# Patient Record
Sex: MALE | Race: WHITE | NOT HISPANIC OR LATINO | Employment: UNEMPLOYED | URBAN - METROPOLITAN AREA
[De-identification: names, ages, dates, MRNs, and addresses within clinical notes are randomized per-mention and may not be internally consistent; named-entity substitution may affect disease eponyms.]

---

## 2017-08-29 ENCOUNTER — HOSPITAL ENCOUNTER (EMERGENCY)
Facility: HOSPITAL | Age: 67
Discharge: HOME | End: 2017-08-29
Attending: EMERGENCY MEDICINE
Payer: COMMERCIAL

## 2017-08-29 VITALS
HEIGHT: 70 IN | DIASTOLIC BLOOD PRESSURE: 77 MMHG | RESPIRATION RATE: 16 BRPM | OXYGEN SATURATION: 98 % | TEMPERATURE: 98 F | HEART RATE: 84 BPM | SYSTOLIC BLOOD PRESSURE: 146 MMHG | BODY MASS INDEX: 34.5 KG/M2 | WEIGHT: 241 LBS

## 2017-08-29 DIAGNOSIS — H72.91 RUPTURED TYMPANIC MEMBRANE, RIGHT: ICD-10-CM

## 2017-08-29 DIAGNOSIS — S01.01XA SCALP LACERATION, INITIAL ENCOUNTER: ICD-10-CM
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
## 2017-08-29 PROCEDURE — 99284 EMERGENCY DEPT VISIT MOD MDM: CPT

## 2017-08-29 RX ORDER — INSULIN GLARGINE 300 [IU]/ML
INJECTION, SOLUTION SUBCUTANEOUS
COMMUNITY

## 2017-08-29 RX ORDER — METFORMIN HYDROCHLORIDE 1000 MG/1
500 TABLET ORAL 2 TIMES DAILY WITH MEALS
COMMUNITY

## 2017-08-29 RX ORDER — OLMESARTAN MEDOXOMIL 40 MG/1
40 TABLET ORAL DAILY
COMMUNITY

## 2017-08-29 NOTE — ED TRIAGE NOTES
Pt. States he went out to get the trash can and slipped and fell on his drive way. Pt. States he did not lose consciousness and appears to have 2 cm laceration on the back of his head.

## 2017-08-29 NOTE — ED NOTES
Dr. Meyers made aware of trip and fall, landing on concrete, neg LOC, bleeding from R ear, verbalized understanding

## 2017-08-30 NOTE — DISCHARGE INSTRUCTIONS
Return to the emergency department if you develop worsening headache, persistent vomiting, confusion, excessive sleepiness, or for any new or worsening medical concerns.

## 2017-08-30 NOTE — ED PROVIDER NOTES
Encounter Date: 8/29/2017    SCRIBE #1 NOTE: I, Lisa Weaver, am scribing for, and in the presence of,  Gary Meyers MD. I have scribed the following portions of the note - Other sections scribed: HPI, ROS.       History     Chief Complaint   Patient presents with    Fall     slipped on cement outside his house on way to take out trash. states he hit back of head, and now has laceration to back of head. C/o bleeding from R ear. Denies LOC. Takes baby ASA daily.      CC: Fall    HPI: 67 year old male with DM and HTN presents to the ED c/o bleeding from the R ear with associated difficulty hearing and a laceration to the occiput s/p fall 5 hours ago. Pt is currently c/o having a headache that began 30 minutes ago. Pt reports he went out to get the garbage and slipped on wet pavement. Pt fell backwards onto his bottom hitting his shoulders and then the back of his head. No LOC. Pt was able to ambulate at the scene. Pt reports no further complaints. Pt denies fever, chills, chest pain, SOB, N/V, vision changes, and any other associated symptoms. No alleviating factors.    Tetanus not UTD.      The history is provided by the patient. No  was used.     Review of patient's allergies indicates:  No Known Allergies  Past Medical History:   Diagnosis Date    Diabetes mellitus     Hypertension      Past Surgical History:   Procedure Laterality Date    PROSTATE SURGERY      TONSILLECTOMY       History reviewed. No pertinent family history.  Social History   Substance Use Topics    Smoking status: Never Smoker    Smokeless tobacco: Never Used    Alcohol use 0.6 oz/week     1 Glasses of wine per week      Comment: occasionally      Review of Systems   Constitutional: Negative for chills, diaphoresis and fever.   HENT: Negative for ear pain and sore throat.         (+) bleeding from the R ear   Eyes: Negative for photophobia and visual disturbance.   Respiratory: Negative for cough and shortness of  breath.    Cardiovascular: Negative for chest pain.   Gastrointestinal: Negative for abdominal pain, diarrhea, nausea and vomiting.   Genitourinary: Negative for dysuria.   Musculoskeletal: Negative for back pain.   Skin: Positive for wound (laceration to the occiput). Negative for rash.   Neurological: Positive for headaches.        (-) LOC       Physical Exam     Initial Vitals [08/29/17 1503]   BP Pulse Resp Temp SpO2   (!) 141/81 78 18 98.3 °F (36.8 °C) 98 %      MAP       101         Physical Exam    Constitutional: He appears well-developed and well-nourished. He is not diaphoretic. No distress.   HENT:   Head: Normocephalic.   Right Ear: External ear normal.   Left Ear: External ear normal.   Occipital scalp swelling, also right tympanic membrane rupture   Eyes: Conjunctivae and EOM are normal. Pupils are equal, round, and reactive to light.   Neck: Normal range of motion.   Cardiovascular: Normal rate and regular rhythm.   Pulmonary/Chest: No respiratory distress.   Abdominal: He exhibits no distension.   Musculoskeletal: He exhibits no edema.   Neurological: He is alert. GCS eye subscore is 4. GCS verbal subscore is 5. GCS motor subscore is 6.   Skin: Skin is warm and dry.   Psychiatric: He has a normal mood and affect. Thought content normal.         ED Course   Procedures  Labs Reviewed - No data to display          Medical Decision Making:   Initial Assessment:   67-year-old male presents complaining of bleeding from his right ear along with scalp laceration the setting of a mechanical fall.  Physical examination does confirm a right tympanic membrane perforation along with 2 occipital scalp lacerations, one of which will require closure.  Independently Interpreted Test(s):   I have ordered and independently interpreted X-rays - see summary below.       <> Summary of X-Ray Reading(s): CT head: No acute abnormality  ED Management:  Have closed scalp laceration with skin glue given that it is in a  relatively hairless area and the edges are fairly well approximated.  Have provided precautions regarding patient's right tympanic membrane rupture and will recommend follow-up with ENT in one week.            Scribe Attestation:   Scribe #1: I performed the above scribed service and the documentation accurately describes the services I performed. I attest to the accuracy of the note.    Attending Attestation:           Physician Attestation for Scribe:  Physician Attestation Statement for Scribe #1: I, Gary Meyers MD, reviewed documentation, as scribed by Lisa Weaver in my presence, and it is both accurate and complete.                 ED Course      Clinical Impression:   The primary encounter diagnosis was Fall, initial encounter. Diagnoses of Ruptured tympanic membrane, right and Scalp laceration, initial encounter were also pertinent to this visit.                           Gary Meyers III, MD  09/03/17 2027